# Patient Record
Sex: MALE | Race: BLACK OR AFRICAN AMERICAN | NOT HISPANIC OR LATINO
[De-identification: names, ages, dates, MRNs, and addresses within clinical notes are randomized per-mention and may not be internally consistent; named-entity substitution may affect disease eponyms.]

---

## 2023-03-13 PROBLEM — Z00.00 ENCOUNTER FOR PREVENTIVE HEALTH EXAMINATION: Status: ACTIVE | Noted: 2023-03-13

## 2023-03-14 ENCOUNTER — APPOINTMENT (OUTPATIENT)
Dept: ORTHOPEDIC SURGERY | Facility: CLINIC | Age: 38
End: 2023-03-14
Payer: COMMERCIAL

## 2023-03-14 VITALS — BODY MASS INDEX: 44.3 KG/M2 | WEIGHT: 250 LBS | HEIGHT: 63 IN

## 2023-03-14 DIAGNOSIS — Z78.9 OTHER SPECIFIED HEALTH STATUS: ICD-10-CM

## 2023-03-14 DIAGNOSIS — M25.569 PAIN IN UNSPECIFIED KNEE: ICD-10-CM

## 2023-03-14 PROCEDURE — 99203 OFFICE O/P NEW LOW 30 MIN: CPT

## 2023-03-14 PROCEDURE — 73562 X-RAY EXAM OF KNEE 3: CPT | Mod: RT

## 2023-03-14 NOTE — ASSESSMENT
[FreeTextEntry1] : acute onset of right knee pain since early 2023.  no injury or trauma.  seems mostly pf pain with bending.  occ clicking.   likely pf pain. possible meniscus.\par \par

## 2023-03-14 NOTE — HISTORY OF PRESENT ILLNESS
[5] : 5 [0] : 0 [Radiating] : radiating [Constant] : constant [Nothing helps with pain getting better] : Nothing helps with pain getting better [Walking] : walking [de-identified] : 3/14/23:  acute onset of right knee pain since early 2023.  no injury or trauma.  pain worse with stairs, bending, squatting.   [] : no [FreeTextEntry1] : right knee [FreeTextEntry6] : sore  [FreeTextEntry7] : down the leg.

## 2023-03-14 NOTE — PHYSICAL EXAM
[NL (0)] : extension 0 degrees [4___] : hamstring 4[unfilled]/5 [Equivocal] : equivocal Tom [] : no erythema [Right] : right knee [There are no fractures, subluxations or dislocations. No significant abnormalities are seen] : There are no fractures, subluxations or dislocations. No significant abnormalities are seen [TWNoteComboBox7] : flexion 110 degrees

## 2023-03-14 NOTE — DISCUSSION/SUMMARY
[de-identified] : Progress Note completed by Carol Maloney PA-C\par * Dr. Vallejo -- The documentation recorded in this note accurately reflects the decisions made by me during this visit.

## 2023-03-19 ENCOUNTER — FORM ENCOUNTER (OUTPATIENT)
Age: 38
End: 2023-03-19

## 2023-03-20 ENCOUNTER — APPOINTMENT (OUTPATIENT)
Dept: MRI IMAGING | Facility: CLINIC | Age: 38
End: 2023-03-20
Payer: COMMERCIAL

## 2023-03-20 PROCEDURE — 73721 MRI JNT OF LWR EXTRE W/O DYE: CPT | Mod: RT

## 2023-04-06 ENCOUNTER — APPOINTMENT (OUTPATIENT)
Dept: ORTHOPEDIC SURGERY | Facility: CLINIC | Age: 38
End: 2023-04-06
Payer: COMMERCIAL

## 2023-04-06 VITALS — HEIGHT: 63 IN | BODY MASS INDEX: 44.3 KG/M2 | WEIGHT: 250 LBS

## 2023-04-06 DIAGNOSIS — S83.91XA SPRAIN OF UNSPECIFIED SITE OF RIGHT KNEE, INITIAL ENCOUNTER: ICD-10-CM

## 2023-04-06 DIAGNOSIS — M22.2X1 PATELLOFEMORAL DISORDERS, RIGHT KNEE: ICD-10-CM

## 2023-04-06 PROCEDURE — 99214 OFFICE O/P EST MOD 30 MIN: CPT

## 2023-04-06 NOTE — ASSESSMENT
[FreeTextEntry1] : acute onset of right knee pain since early 2023. mri 2023 shows mostly oa. no large tear.\par \par

## 2023-04-06 NOTE — DISCUSSION/SUMMARY
[de-identified] : Progress Note completed by Carol Maloney PA-C\par * Dr. Vallejo -- The documentation recorded in this note accurately reflects the decisions made by me during this visit.

## 2023-04-06 NOTE — PHYSICAL EXAM
[NL (0)] : extension 0 degrees [4___] : hamstring 4[unfilled]/5 [Equivocal] : equivocal Tom [Right] : right knee [There are no fractures, subluxations or dislocations. No significant abnormalities are seen] : There are no fractures, subluxations or dislocations. No significant abnormalities are seen [] : no erythema [TWNoteComboBox7] : flexion 110 degrees

## 2023-04-06 NOTE — HISTORY OF PRESENT ILLNESS
[4] : 4 [0] : 0 [Dull/Aching] : dull/aching [de-identified] : 3/14/23:  acute onset of right knee pain since early 2023.  no injury or trauma.  pain worse with stairs, bending, squatting.  \par 4/6/23:  follow up right knee. [] : Post Surgical Visit: no [FreeTextEntry1] : right knee

## 2023-05-18 ENCOUNTER — APPOINTMENT (OUTPATIENT)
Dept: ORTHOPEDIC SURGERY | Facility: CLINIC | Age: 38
End: 2023-05-18